# Patient Record
Sex: FEMALE | Race: WHITE | NOT HISPANIC OR LATINO | Employment: OTHER | URBAN - METROPOLITAN AREA
[De-identification: names, ages, dates, MRNs, and addresses within clinical notes are randomized per-mention and may not be internally consistent; named-entity substitution may affect disease eponyms.]

---

## 2023-02-15 ENCOUNTER — HOSPITAL ENCOUNTER (OUTPATIENT)
Dept: TELEMEDICINE | Facility: HOSPITAL | Age: 83
Discharge: HOME | End: 2023-02-15

## 2023-02-15 DIAGNOSIS — I63.9 CEREBELLAR INFARCT: Primary | ICD-10-CM

## 2023-02-15 NOTE — CARE UPDATE
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 Unfortunately, CTH images are not available on the system for my own review.  As per ED attending, patient has only mild HA with  preserved mental and no focal neurological signs on exam, and her symptoms apparently began 3 days ago.  I conveyed my impression that if patient is intact neurologically and 3 days after symptoms onset, it is unlikely that she will require emergent surgical intervention at this point.  Nonetheless, I recommended that she gets transferred to a facility where she can have close neurological surveillance in case her neuro status decline.      .Shane Javier MD   Vascular Neurology  [facility]  [facility]

## 2023-02-16 PROBLEM — N17.9 AKI (ACUTE KIDNEY INJURY): Status: ACTIVE | Noted: 2023-02-16

## 2023-02-16 PROBLEM — I63.542 OCCLUSION OF LEFT POSTERIOR INFERIOR CEREBELLAR ARTERY WITH INFARCTION: Status: ACTIVE | Noted: 2023-02-16

## 2023-02-16 PROBLEM — I48.91 ATRIAL FIBRILLATION WITH RVR: Status: ACTIVE | Noted: 2023-02-16

## 2023-02-16 PROBLEM — G91.1 OBSTRUCTIVE HYDROCEPHALUS: Status: ACTIVE | Noted: 2023-02-16

## 2023-02-16 PROBLEM — I48.11 LONGSTANDING PERSISTENT ATRIAL FIBRILLATION: Status: ACTIVE | Noted: 2023-02-16

## 2023-02-16 PROBLEM — I50.9 CHF (CONGESTIVE HEART FAILURE): Status: ACTIVE | Noted: 2023-02-16

## 2023-02-17 PROBLEM — N17.9 AKI (ACUTE KIDNEY INJURY): Status: RESOLVED | Noted: 2023-02-16 | Resolved: 2023-02-17

## 2023-02-21 PROBLEM — I63.9 CEREBELLAR INFARCT: Status: ACTIVE | Noted: 2023-02-16

## 2023-02-21 PROBLEM — N17.9 AKI (ACUTE KIDNEY INJURY): Status: RESOLVED | Noted: 2023-02-16 | Resolved: 2023-02-21

## 2023-02-21 PROBLEM — I63.542 OCCLUSION OF LEFT POSTERIOR INFERIOR CEREBELLAR ARTERY WITH INFARCTION: Status: ACTIVE | Noted: 2023-02-21

## 2023-03-14 PROBLEM — R63.30 FEEDING DIFFICULTIES: Status: ACTIVE | Noted: 2023-03-14
